# Patient Record
Sex: FEMALE | Race: WHITE | NOT HISPANIC OR LATINO
[De-identification: names, ages, dates, MRNs, and addresses within clinical notes are randomized per-mention and may not be internally consistent; named-entity substitution may affect disease eponyms.]

---

## 2019-02-22 ENCOUNTER — APPOINTMENT (OUTPATIENT)
Dept: FAMILY MEDICINE | Facility: CLINIC | Age: 34
End: 2019-02-22
Payer: COMMERCIAL

## 2019-02-22 ENCOUNTER — NON-APPOINTMENT (OUTPATIENT)
Age: 34
End: 2019-02-22

## 2019-02-22 VITALS
WEIGHT: 165 LBS | SYSTOLIC BLOOD PRESSURE: 120 MMHG | DIASTOLIC BLOOD PRESSURE: 80 MMHG | OXYGEN SATURATION: 98 % | HEART RATE: 93 BPM | HEIGHT: 61 IN | BODY MASS INDEX: 31.15 KG/M2

## 2019-02-22 DIAGNOSIS — Z86.59 PERSONAL HISTORY OF OTHER MENTAL AND BEHAVIORAL DISORDERS: ICD-10-CM

## 2019-02-22 DIAGNOSIS — Z83.438 FAMILY HISTORY OF OTHER DISORDER OF LIPOPROTEIN METABOLISM AND OTHER LIPIDEMIA: ICD-10-CM

## 2019-02-22 DIAGNOSIS — Z13.29 ENCOUNTER FOR SCREENING FOR OTHER SUSPECTED ENDOCRINE DISORDER: ICD-10-CM

## 2019-02-22 DIAGNOSIS — Z82.49 FAMILY HISTORY OF ISCHEMIC HEART DISEASE AND OTHER DISEASES OF THE CIRCULATORY SYSTEM: ICD-10-CM

## 2019-02-22 DIAGNOSIS — Z83.3 FAMILY HISTORY OF DIABETES MELLITUS: ICD-10-CM

## 2019-02-22 PROCEDURE — 99385 PREV VISIT NEW AGE 18-39: CPT | Mod: 25

## 2019-02-22 PROCEDURE — 99214 OFFICE O/P EST MOD 30 MIN: CPT | Mod: 25

## 2019-02-22 PROCEDURE — 93000 ELECTROCARDIOGRAM COMPLETE: CPT

## 2019-02-22 PROCEDURE — 96127 BRIEF EMOTIONAL/BEHAV ASSMT: CPT

## 2019-03-23 ENCOUNTER — LABORATORY RESULT (OUTPATIENT)
Age: 34
End: 2019-03-23

## 2019-03-26 ENCOUNTER — LABORATORY RESULT (OUTPATIENT)
Age: 34
End: 2019-03-26

## 2019-03-26 ENCOUNTER — APPOINTMENT (OUTPATIENT)
Dept: FAMILY MEDICINE | Facility: CLINIC | Age: 34
End: 2019-03-26
Payer: SELF-PAY

## 2019-03-26 VITALS
DIASTOLIC BLOOD PRESSURE: 78 MMHG | HEIGHT: 61 IN | WEIGHT: 168 LBS | BODY MASS INDEX: 31.72 KG/M2 | SYSTOLIC BLOOD PRESSURE: 116 MMHG | HEART RATE: 91 BPM | OXYGEN SATURATION: 97 %

## 2019-03-26 DIAGNOSIS — R31.29 OTHER MICROSCOPIC HEMATURIA: ICD-10-CM

## 2019-03-26 LAB
25(OH)D3 SERPL-MCNC: 5.5 NG/ML
ALBUMIN SERPL ELPH-MCNC: 4.2 G/DL
ALP BLD-CCNC: 82 U/L
ALT SERPL-CCNC: 18 U/L
ANION GAP SERPL CALC-SCNC: 10 MMOL/L
APPEARANCE: CLEAR
AST SERPL-CCNC: 29 U/L
BACTERIA: ABNORMAL
BASOPHILS # BLD AUTO: 0.05 K/UL
BASOPHILS NFR BLD AUTO: 0.9 %
BILIRUB SERPL-MCNC: 0.3 MG/DL
BILIRUBIN URINE: NEGATIVE
BLOOD URINE: NEGATIVE
BUN SERPL-MCNC: 19 MG/DL
CALCIUM SERPL-MCNC: 9 MG/DL
CHLORIDE SERPL-SCNC: 103 MMOL/L
CHOLEST SERPL-MCNC: 180 MG/DL
CHOLEST/HDLC SERPL: 2.5 RATIO
CO2 SERPL-SCNC: 25 MMOL/L
COLOR: YELLOW
CREAT SERPL-MCNC: 0.82 MG/DL
EOSINOPHIL # BLD AUTO: 0.34 K/UL
EOSINOPHIL NFR BLD AUTO: 6.4 %
GLUCOSE QUALITATIVE U: NEGATIVE
GLUCOSE SERPL-MCNC: 104 MG/DL
HBA1C MFR BLD HPLC: 4.9 %
HCT VFR BLD CALC: 38.5 %
HDLC SERPL-MCNC: 71 MG/DL
HGB BLD-MCNC: 13 G/DL
HYALINE CASTS: 2 /LPF
IMM GRANULOCYTES NFR BLD AUTO: 0.2 %
KETONES URINE: NEGATIVE
LDLC SERPL CALC-MCNC: 99 MG/DL
LEUKOCYTE ESTERASE URINE: NEGATIVE
LYMPHOCYTES # BLD AUTO: 1.8 K/UL
LYMPHOCYTES NFR BLD AUTO: 33.7 %
MAN DIFF?: NORMAL
MCHC RBC-ENTMCNC: 31 PG
MCHC RBC-ENTMCNC: 33.8 GM/DL
MCV RBC AUTO: 91.9 FL
MICROSCOPIC-UA: NORMAL
MONOCYTES # BLD AUTO: 0.51 K/UL
MONOCYTES NFR BLD AUTO: 9.6 %
NEUTROPHILS # BLD AUTO: 2.63 K/UL
NEUTROPHILS NFR BLD AUTO: 49.2 %
NITRITE URINE: NEGATIVE
PH URINE: 6.5
PLATELET # BLD AUTO: 265 K/UL
POTASSIUM SERPL-SCNC: 5.1 MMOL/L
PROT SERPL-MCNC: 7.1 G/DL
PROTEIN URINE: NORMAL
RBC # BLD: 4.19 M/UL
RBC # FLD: 13 %
RED BLOOD CELLS URINE: 7 /HPF
SODIUM SERPL-SCNC: 138 MMOL/L
SPECIFIC GRAVITY URINE: 1.03
SQUAMOUS EPITHELIAL CELLS: 13 /HPF
TRIGL SERPL-MCNC: 48 MG/DL
TSH SERPL-ACNC: 4.58 UIU/ML
UROBILINOGEN URINE: ABNORMAL
WBC # FLD AUTO: 5.34 K/UL
WHITE BLOOD CELLS URINE: 13 /HPF

## 2019-03-26 PROCEDURE — 99214 OFFICE O/P EST MOD 30 MIN: CPT | Mod: 25

## 2019-03-26 PROCEDURE — 36415 COLL VENOUS BLD VENIPUNCTURE: CPT

## 2019-03-27 LAB
25(OH)D3 SERPL-MCNC: 8.7 NG/ML
APPEARANCE: ABNORMAL
BACTERIA: ABNORMAL
BILIRUBIN URINE: NEGATIVE
BLOOD URINE: NORMAL
COLOR: YELLOW
GLUCOSE QUALITATIVE U: NEGATIVE
HYALINE CASTS: 0 /LPF
KETONES URINE: NEGATIVE
LEUKOCYTE ESTERASE URINE: ABNORMAL
MICROSCOPIC-UA: NORMAL
NITRITE URINE: NEGATIVE
PH URINE: 6
PROTEIN URINE: NORMAL
RED BLOOD CELLS URINE: 8 /HPF
SPECIFIC GRAVITY URINE: 1.04
SQUAMOUS EPITHELIAL CELLS: 12 /HPF
T3 SERPL-MCNC: 95 NG/DL
T3FREE SERPL-MCNC: 2.54 PG/ML
T3RU NFR SERPL: 1.1 TBI
T4 FREE SERPL-MCNC: 1 NG/DL
T4 SERPL-MCNC: 5.8 UG/DL
THYROGLOB AB SERPL-ACNC: <20 IU/ML
THYROPEROXIDASE AB SERPL IA-ACNC: <10 IU/ML
TSH SERPL-ACNC: 2.49 UIU/ML
UROBILINOGEN URINE: NORMAL
WHITE BLOOD CELLS URINE: 69 /HPF

## 2019-03-28 ENCOUNTER — MEDICATION RENEWAL (OUTPATIENT)
Age: 34
End: 2019-03-28

## 2019-03-28 LAB — BACTERIA UR CULT: NORMAL

## 2019-03-29 LAB — TSI ACT/NOR SER: <0.1 IU/L

## 2019-06-04 ENCOUNTER — APPOINTMENT (OUTPATIENT)
Dept: FAMILY MEDICINE | Facility: CLINIC | Age: 34
End: 2019-06-04

## 2020-07-17 ENCOUNTER — APPOINTMENT (OUTPATIENT)
Dept: FAMILY MEDICINE | Facility: CLINIC | Age: 35
End: 2020-07-17
Payer: COMMERCIAL

## 2020-07-17 VITALS
DIASTOLIC BLOOD PRESSURE: 70 MMHG | OXYGEN SATURATION: 97 % | WEIGHT: 208 LBS | HEART RATE: 118 BPM | RESPIRATION RATE: 16 BRPM | BODY MASS INDEX: 39.27 KG/M2 | SYSTOLIC BLOOD PRESSURE: 140 MMHG | HEIGHT: 61 IN

## 2020-07-17 DIAGNOSIS — Z12.83 ENCOUNTER FOR SCREENING FOR MALIGNANT NEOPLASM OF SKIN: ICD-10-CM

## 2020-07-17 DIAGNOSIS — Z86.59 PERSONAL HISTORY OF OTHER MENTAL AND BEHAVIORAL DISORDERS: ICD-10-CM

## 2020-07-17 DIAGNOSIS — Z12.4 ENCOUNTER FOR SCREENING FOR MALIGNANT NEOPLASM OF CERVIX: ICD-10-CM

## 2020-07-17 DIAGNOSIS — Z87.09 PERSONAL HISTORY OF OTHER DISEASES OF THE RESPIRATORY SYSTEM: ICD-10-CM

## 2020-07-17 PROCEDURE — 99385 PREV VISIT NEW AGE 18-39: CPT

## 2020-07-17 NOTE — PLAN
[FreeTextEntry1] : Anxiety\par - well controlled\par - cont lexapro\par \par Hx of elevated tsh\par - f/u thyroid panel\par \par Obesity\par - counseled on importance of diet and exercise \par \par HCM\par - referral for skin CA screen\par - GYN referral\par - f/u labs

## 2020-07-17 NOTE — PHYSICAL EXAM
[No Acute Distress] : no acute distress [Well-Appearing] : well-appearing [PERRL] : pupils equal round and reactive to light [Normal Sclera/Conjunctiva] : normal sclera/conjunctiva [No Respiratory Distress] : no respiratory distress  [Normal Outer Ear/Nose] : the outer ears and nose were normal in appearance [Clear to Auscultation] : lungs were clear to auscultation bilaterally [No Accessory Muscle Use] : no accessory muscle use [Normal Rate] : normal rate  [Regular Rhythm] : with a regular rhythm [Soft] : abdomen soft [Non Tender] : non-tender [Non-distended] : non-distended [Normal Bowel Sounds] : normal bowel sounds [No Joint Swelling] : no joint swelling [Grossly Normal Strength/Tone] : grossly normal strength/tone [No Focal Deficits] : no focal deficits [Normal Affect] : the affect was normal [Normal Insight/Judgement] : insight and judgment were intact

## 2020-07-17 NOTE — COUNSELING
[Benefits of weight loss discussed] : Benefits of weight loss discussed [Potential consequences of obesity discussed] : Potential consequences of obesity discussed [Encouraged to maintain food diary] : Encouraged to maintain food diary [Structured Weight Management Program suggested:] : Structured weight management program suggested [Encouraged to increase physical activity] : Encouraged to increase physical activity

## 2020-07-17 NOTE — HISTORY OF PRESENT ILLNESS
[FreeTextEntry1] : establish care, CPE [de-identified] : 34 year old female presents to establish care. Has pmhx of anxiety/depression which is well controlled with lexapro. She does complain of weight gain with lexapro but she would like to continue it as she feels better with it. She had no insurance for past year so needs f/u appts with GYN and derm. Overall she feels well. 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 She has recently tried to make diet changes.

## 2020-09-03 LAB
25(OH)D3 SERPL-MCNC: 11.1 NG/ML
ALBUMIN SERPL ELPH-MCNC: 4.5 G/DL
ALP BLD-CCNC: 101 U/L
ALT SERPL-CCNC: 70 U/L
ANION GAP SERPL CALC-SCNC: 13 MMOL/L
AST SERPL-CCNC: 26 U/L
BASOPHILS # BLD AUTO: 0.04 K/UL
BASOPHILS NFR BLD AUTO: 0.7 %
BILIRUB SERPL-MCNC: 0.3 MG/DL
BUN SERPL-MCNC: 16 MG/DL
CALCIUM SERPL-MCNC: 9.4 MG/DL
CHLORIDE SERPL-SCNC: 101 MMOL/L
CHOLEST SERPL-MCNC: 194 MG/DL
CHOLEST/HDLC SERPL: 3.2 RATIO
CO2 SERPL-SCNC: 26 MMOL/L
CREAT SERPL-MCNC: 0.93 MG/DL
EOSINOPHIL # BLD AUTO: 0.4 K/UL
EOSINOPHIL NFR BLD AUTO: 6.5 %
ESTIMATED AVERAGE GLUCOSE: 100 MG/DL
GLUCOSE SERPL-MCNC: 109 MG/DL
HBA1C MFR BLD HPLC: 5.1 %
HCT VFR BLD CALC: 42.1 %
HDLC SERPL-MCNC: 61 MG/DL
HGB BLD-MCNC: 13.6 G/DL
IMM GRANULOCYTES NFR BLD AUTO: 0.2 %
LDLC SERPL CALC-MCNC: 118 MG/DL
LYMPHOCYTES # BLD AUTO: 1.83 K/UL
LYMPHOCYTES NFR BLD AUTO: 29.9 %
MAN DIFF?: NORMAL
MCHC RBC-ENTMCNC: 30.8 PG
MCHC RBC-ENTMCNC: 32.3 GM/DL
MCV RBC AUTO: 95.2 FL
MONOCYTES # BLD AUTO: 0.55 K/UL
MONOCYTES NFR BLD AUTO: 9 %
NEUTROPHILS # BLD AUTO: 3.3 K/UL
NEUTROPHILS NFR BLD AUTO: 53.7 %
PLATELET # BLD AUTO: 314 K/UL
POTASSIUM SERPL-SCNC: 4.7 MMOL/L
PROT SERPL-MCNC: 6.9 G/DL
RBC # BLD: 4.42 M/UL
RBC # FLD: 13.4 %
SODIUM SERPL-SCNC: 141 MMOL/L
T3FREE SERPL-MCNC: 3.91 PG/ML
T4 FREE SERPL-MCNC: 1.2 NG/DL
TRIGL SERPL-MCNC: 75 MG/DL
TSH SERPL-ACNC: 6.07 UIU/ML
WBC # FLD AUTO: 6.13 K/UL

## 2020-09-03 RX ORDER — LEVOTHYROXINE SODIUM 0.03 MG/1
25 TABLET ORAL
Qty: 90 | Refills: 0 | Status: ACTIVE | COMMUNITY
Start: 2020-09-03 | End: 1900-01-01

## 2020-09-11 ENCOUNTER — APPOINTMENT (OUTPATIENT)
Dept: FAMILY MEDICINE | Facility: CLINIC | Age: 35
End: 2020-09-11

## 2021-10-13 ENCOUNTER — NON-APPOINTMENT (OUTPATIENT)
Age: 36
End: 2021-10-13

## 2021-10-15 ENCOUNTER — APPOINTMENT (OUTPATIENT)
Dept: FAMILY MEDICINE | Facility: CLINIC | Age: 36
End: 2021-10-15
Payer: COMMERCIAL

## 2021-10-15 VITALS
HEART RATE: 102 BPM | RESPIRATION RATE: 16 BRPM | TEMPERATURE: 97.9 F | HEIGHT: 61 IN | WEIGHT: 209 LBS | DIASTOLIC BLOOD PRESSURE: 80 MMHG | OXYGEN SATURATION: 98 % | BODY MASS INDEX: 39.46 KG/M2 | SYSTOLIC BLOOD PRESSURE: 110 MMHG

## 2021-10-15 DIAGNOSIS — R79.89 OTHER SPECIFIED ABNORMAL FINDINGS OF BLOOD CHEMISTRY: ICD-10-CM

## 2021-10-15 DIAGNOSIS — Z13.220 ENCOUNTER FOR SCREENING FOR LIPOID DISORDERS: ICD-10-CM

## 2021-10-15 DIAGNOSIS — Z13.21 ENCOUNTER FOR SCREENING FOR NUTRITIONAL DISORDER: ICD-10-CM

## 2021-10-15 DIAGNOSIS — Z13.1 ENCOUNTER FOR SCREENING FOR DIABETES MELLITUS: ICD-10-CM

## 2021-10-15 DIAGNOSIS — Z00.00 ENCOUNTER FOR GENERAL ADULT MEDICAL EXAMINATION W/OUT ABNORMAL FINDINGS: ICD-10-CM

## 2021-10-15 DIAGNOSIS — F41.8 OTHER SPECIFIED ANXIETY DISORDERS: ICD-10-CM

## 2021-10-15 PROCEDURE — 96127 BRIEF EMOTIONAL/BEHAV ASSMT: CPT

## 2021-10-15 PROCEDURE — 99395 PREV VISIT EST AGE 18-39: CPT | Mod: 25

## 2021-10-15 RX ORDER — ESCITALOPRAM OXALATE 20 MG/1
20 TABLET ORAL DAILY
Qty: 90 | Refills: 3 | Status: ACTIVE | COMMUNITY
Start: 2019-02-22 | End: 1900-01-01

## 2021-10-15 NOTE — HEALTH RISK ASSESSMENT
[Patient reported PAP Smear was normal] : Patient reported PAP Smear was normal [PapSmearDate] : 2020

## 2021-10-15 NOTE — PLAN
[FreeTextEntry1] : Anxiety/depression\par - cont lexapro\par - cont therapy\par \par Elevated TSH\par - f/u repeat labs \par - further plan based on labs\par \par Low vit D\par - f/u labs\par \par CPE\par - encourage healthy diet and exercise\par - due for GYN

## 2021-10-15 NOTE — HISTORY OF PRESENT ILLNESS
[FreeTextEntry1] : CPE [de-identified] : 36 year old female presents for CPE. Overall feeling well. Does admit to increased stress and anxiety that is work related this week. In general has been feeling calmer and stable. \par Saw GYN last year that was normal\par Hx of elevated TSH- took synthroid and did not feel any different so stopped taking it.

## 2021-10-16 LAB
25(OH)D3 SERPL-MCNC: 14 NG/ML
ALBUMIN SERPL ELPH-MCNC: 4.4 G/DL
ALP BLD-CCNC: 105 U/L
ALT SERPL-CCNC: 32 U/L
ANION GAP SERPL CALC-SCNC: 12 MMOL/L
AST SERPL-CCNC: 15 U/L
BASOPHILS # BLD AUTO: 0.06 K/UL
BASOPHILS NFR BLD AUTO: 1.1 %
BILIRUB SERPL-MCNC: 0.4 MG/DL
BUN SERPL-MCNC: 14 MG/DL
CALCIUM SERPL-MCNC: 9.3 MG/DL
CHLORIDE SERPL-SCNC: 102 MMOL/L
CHOLEST SERPL-MCNC: 180 MG/DL
CO2 SERPL-SCNC: 23 MMOL/L
CREAT SERPL-MCNC: 0.9 MG/DL
EOSINOPHIL # BLD AUTO: 0.42 K/UL
EOSINOPHIL NFR BLD AUTO: 7.8 %
ESTIMATED AVERAGE GLUCOSE: 105 MG/DL
FOLATE SERPL-MCNC: 2.4 NG/ML
GLUCOSE SERPL-MCNC: 104 MG/DL
HBA1C MFR BLD HPLC: 5.3 %
HCT VFR BLD CALC: 39.6 %
HDLC SERPL-MCNC: 57 MG/DL
HGB BLD-MCNC: 14.1 G/DL
IMM GRANULOCYTES NFR BLD AUTO: 0.2 %
LDLC SERPL CALC-MCNC: 111 MG/DL
LYMPHOCYTES # BLD AUTO: 1.82 K/UL
LYMPHOCYTES NFR BLD AUTO: 33.8 %
MAN DIFF?: NORMAL
MCHC RBC-ENTMCNC: 31.5 PG
MCHC RBC-ENTMCNC: 35.6 GM/DL
MCV RBC AUTO: 88.6 FL
MONOCYTES # BLD AUTO: 0.4 K/UL
MONOCYTES NFR BLD AUTO: 7.4 %
NEUTROPHILS # BLD AUTO: 2.68 K/UL
NEUTROPHILS NFR BLD AUTO: 49.7 %
NONHDLC SERPL-MCNC: 123 MG/DL
PLATELET # BLD AUTO: 334 K/UL
POTASSIUM SERPL-SCNC: 4.5 MMOL/L
PROT SERPL-MCNC: 7.3 G/DL
RBC # BLD: 4.47 M/UL
RBC # FLD: 12.7 %
SODIUM SERPL-SCNC: 137 MMOL/L
T4 FREE SERPL-MCNC: 1.3 NG/DL
TRIGL SERPL-MCNC: 56 MG/DL
TSH SERPL-ACNC: 2.36 UIU/ML
VIT B12 SERPL-MCNC: 410 PG/ML
WBC # FLD AUTO: 5.39 K/UL

## 2021-10-16 RX ORDER — ERGOCALCIFEROL 1.25 MG/1
1.25 MG CAPSULE, LIQUID FILLED ORAL
Qty: 12 | Refills: 1 | Status: ACTIVE | COMMUNITY
Start: 2019-03-26 | End: 1900-01-01

## 2021-10-18 LAB
THYROGLOB AB SERPL-ACNC: <20 IU/ML
THYROPEROXIDASE AB SERPL IA-ACNC: <10 IU/ML

## 2022-11-07 ENCOUNTER — RX RENEWAL (OUTPATIENT)
Age: 37
End: 2022-11-07

## 2024-01-29 ENCOUNTER — NON-APPOINTMENT (OUTPATIENT)
Age: 39
End: 2024-01-29